# Patient Record
(demographics unavailable — no encounter records)

---

## 2025-01-21 NOTE — PLAN
[FreeTextEntry1] : discussed w/u for PMB. Will start with tvus and then possible sampling of lining depending on results. She states she will probably want to do that regardless of results. Having heart surgery Feb 11. Will call with sono results.

## 2025-01-21 NOTE — PHYSICAL EXAM
[Appropriately responsive] : appropriately responsive [Alert] : alert [No Acute Distress] : no acute distress [Vulvar Atrophy] : vulvar atrophy [Labia Majora] : normal [Normal] : normal [Tenderness] : nontender [Enlarged ___ wks] : not enlarged [Mass ___ cm] : no uterine mass was palpated [Uterine Adnexae] : non-palpable [External Hemorrhoid] : external hemorrhoid [FreeTextEntry5] : min brown blood from cervix

## 2025-01-21 NOTE — HISTORY OF PRESENT ILLNESS
[FreeTextEntry1] : Pt of Dr. Decker urgent visit. Pt states last night had brown vaginal dc and today light but red vb and cramping. This has never happened before. On HRT for several years. 2 prior 's

## 2025-01-23 NOTE — PROCEDURE
[Endometrial Biopsy] : Endometrial biopsy [Time out performed] : Pre-procedure time out performed.  Patient's name, date of birth and procedure confirmed. [Consent Obtained] : Consent obtained [Thickened Endometrium] : thickened endometrium [Post-Menop. Bleeding] : post-menopausal bleeding [Risks] : risks [Benefits] : benefits [Alternatives] : alternatives [Patient] : patient [Infection] : infection [Bleeding] : bleeding [Allergic Reaction] : allergic reaction [Uterine Perforation] : uterine perforation [Pain] : pain [N/A] : pregnancy test not applicable [Cytotec] : Cytotec [Betadine] : Betadine [None] : none [Paracervical Block] : paracervical block was performed [___ mL Injected] : [unfilled] ~UmL of lidocaine [0.01] : 0.01 [Without Epi] : without epinephrine [Tenaculum] : Tenaculum [Required Dilation] : required dilation [Anteverted] : anteverted [Abundant] : abundant [Specimen Collected] : collected [Sent to Pathology] : placed in buffered formalin and sent for pathology [Tolerated Well] : Patient tolerated the procedure well [No Complications] : No complications [de-identified] : abundant thick, old blood, 2 passes, filled pipelle

## 2025-02-07 NOTE — PLAN
[FreeTextEntry1] : repeat embx done because patient states the bleeding has lessened and sees more tissue coming out so repeat bx done to recheck endometrium. Mirena unable to be placed so kyleena placed. pt has heart surgery Monday and then will schedule D and C with Dr. Decker. FU repeat embx in the meantime.

## 2025-02-07 NOTE — PROCEDURE
[Endometrial Biopsy] : Endometrial biopsy [Allergic Reaction] : allergic reaction [Negative] : negative pregnancy test [None] : none [___ mL Injected] : [unfilled] ~UmL of lidocaine [Anteverted] : anteverted [Moderate] : moderate [Specimen Collected] : collected [Sent to Pathology] : placed in buffered formalin and sent for pathology [IUD Placement] : intrauterine device (IUD) placement [Time out performed] : Pre-procedure time out performed.  Patient's name, date of birth and procedure confirmed. [Consent Obtained] : Consent obtained [Abnormal Uterine Bleeding] : abnormal uterine bleeding [Risks] : risks [Benefits] : benefits [Alternatives] : alternatives [Patient] : patient [Infection] : infection [Bleeding] : bleeding [Pain] : pain [Expulsion] : expulsion [Failure] : failure [Uterine Perforation] : uterine perforation [Cytotec] : Cytotec [Betadine] : Betadine [Tenaculum] : Tenaculum [Required Dilation] : required dilation [Sounded to ___ cm] : sounded to [unfilled] ~Ucm [Post Placement Transvag. US] : post placement transvaginal ultrasound [Kyleena IUD] : Kyleena IUD [Tolerated Well] : Patient tolerated the procedure well [No Complications] : No complications [de-identified] : IUD in situ, unable to insert Mirena despite paracervical block and cytotec, therefore kyleena was inserted. [de-identified] : [id number] [de-identified] : 1/2027

## 2025-03-10 NOTE — PLAN
[FreeTextEntry1] : PT WITH PMB. WITH LNG IUD IN PLACE. CANNOT HAVE BX YET DUE TO RECENT CARDIAC SURGERY. ADVISED TO WAIT AT THIS TIME FOR BX. WILL RTO 12 WEEKS FOR HYSTEROSCOPY AND BIOPSY.

## 2025-03-10 NOTE — HISTORY OF PRESENT ILLNESS
[FreeTextEntry1] : PT WITH RECENT VALVULAR HEART DISEASE. HAD MITRAL AND TRICUSPID VALVE REPLACEMENT. HAS MIRENA IN PLACE DUE TO POSTMENOPAUSAL BLEEDING. AWAITING DOING HYSTEROSCOPY WITH ENDOMETRIAL BIOPSY

## 2025-06-23 NOTE — ASSESSMENT
[FreeTextEntry1] : USING ASEPTIC TECHNIQUE AND A PARACERVICAL BLOCK, A HYSTEROSCOPY WAS PERFORMED USING THE FLEXIBLE  HYSTEROSCOPE. NORMAL SALINE WAS USED FOR A DISTENSION MEDIUM. THE TUBAL OSTIA WERE VISUALIZED. THE ENDOMETRIAL CAVITY WAS ASSESSED. BIOPSY OBTAINED. AFTER CARE INSTRUCTIONS AND RESTRICTIONS GIVEN. FOLLOWUP DISCUSSED

## 2025-06-23 NOTE — PROCEDURE
[Hysteroscopy] : Hysteroscopy [Time out performed] : Pre-procedure time out performed.  Patient's name, date of birth and procedure confirmed. [Consent Obtained] : Consent obtained [Postmenopausal bleeding] : postmenopausal bleeding [Risks] : risks [Benefits] : benefits [Alternatives] : alternatives [Patient] : patient [Infection] : infection [Bleeding] : bleeding [Allergic Reaction] : allergic reaction [flexible] : Using aseptic technique a hysteroscopy was performed using a flexible hysteroscope [Sent to Pathology] : specimen was placed in buffered formalin and sent for pathology [Hemostasis obtained] : hemostasis obtained [Tolerated Well] : Patient tolerated the procedure well [Aftercare instructions/regstrictions given and follow-up scheduled] : Aftercare instructions/restrictions given and follow-up scheduled [Antibiotics given] : antibiotics not given [de-identified] : AFTER IUD REMOVAL, ENDOMETRIAL POLYPS NOTED. TUBAL OSTIA VISUALIZED